# Patient Record
Sex: MALE | Race: WHITE
[De-identification: names, ages, dates, MRNs, and addresses within clinical notes are randomized per-mention and may not be internally consistent; named-entity substitution may affect disease eponyms.]

---

## 2019-12-23 ENCOUNTER — HOSPITAL ENCOUNTER (OUTPATIENT)
Dept: HOSPITAL 74 - JASU-ENDO | Age: 49
Discharge: HOME | End: 2019-12-23
Attending: INTERNAL MEDICINE
Payer: COMMERCIAL

## 2019-12-23 VITALS — BODY MASS INDEX: 0.1 KG/M2

## 2019-12-23 VITALS — TEMPERATURE: 97.6 F

## 2019-12-23 VITALS — HEART RATE: 60 BPM

## 2019-12-23 VITALS — SYSTOLIC BLOOD PRESSURE: 104 MMHG | DIASTOLIC BLOOD PRESSURE: 74 MMHG

## 2019-12-23 DIAGNOSIS — K64.8: ICD-10-CM

## 2019-12-23 DIAGNOSIS — K57.20: ICD-10-CM

## 2019-12-23 DIAGNOSIS — Z12.11: Primary | ICD-10-CM

## 2019-12-23 DIAGNOSIS — Z83.71: ICD-10-CM

## 2019-12-23 PROCEDURE — 0DJD8ZZ INSPECTION OF LOWER INTESTINAL TRACT, VIA NATURAL OR ARTIFICIAL OPENING ENDOSCOPIC: ICD-10-PCS | Performed by: INTERNAL MEDICINE

## 2023-07-03 PROBLEM — Z00.00 ENCOUNTER FOR PREVENTIVE HEALTH EXAMINATION: Status: ACTIVE | Noted: 2023-07-03

## 2023-07-05 ENCOUNTER — APPOINTMENT (OUTPATIENT)
Dept: PEDIATRIC ORTHOPEDIC SURGERY | Facility: CLINIC | Age: 53
End: 2023-07-05
Payer: COMMERCIAL

## 2023-07-05 VITALS
DIASTOLIC BLOOD PRESSURE: 70 MMHG | BODY MASS INDEX: 27.11 KG/M2 | TEMPERATURE: 96.8 F | HEIGHT: 69 IN | WEIGHT: 183 LBS | SYSTOLIC BLOOD PRESSURE: 135 MMHG

## 2023-07-05 DIAGNOSIS — Z78.9 OTHER SPECIFIED HEALTH STATUS: ICD-10-CM

## 2023-07-05 DIAGNOSIS — Z85.9 PERSONAL HISTORY OF MALIGNANT NEOPLASM, UNSPECIFIED: ICD-10-CM

## 2023-07-05 DIAGNOSIS — F17.200 NICOTINE DEPENDENCE, UNSPECIFIED, UNCOMPLICATED: ICD-10-CM

## 2023-07-05 DIAGNOSIS — Z80.9 FAMILY HISTORY OF MALIGNANT NEOPLASM, UNSPECIFIED: ICD-10-CM

## 2023-07-05 DIAGNOSIS — M65.872 OTHER SYNOVITIS AND TENOSYNOVITIS, LEFT ANKLE AND FOOT: ICD-10-CM

## 2023-07-05 PROCEDURE — 73610 X-RAY EXAM OF ANKLE: CPT | Mod: LT

## 2023-07-05 PROCEDURE — 99202 OFFICE O/P NEW SF 15 MIN: CPT

## 2023-07-05 RX ORDER — NAPROXEN 375 MG/1
375 TABLET, DELAYED RELEASE ORAL TWICE DAILY
Qty: 60 | Refills: 1 | Status: ACTIVE | COMMUNITY
Start: 2023-07-05 | End: 1900-01-01

## 2023-07-05 NOTE — HISTORY OF PRESENT ILLNESS
[de-identified] : This 53-year-old pleasant gentleman is seen today for evaluation of his left ankle and foot.  He was well until this past holiday weekend when he was in the [facility] well as [place] [facility] and he noted onset of pain swelling and mild limp.  No obvious history of traumatic or precipitating event.  No misstep.  He denies any bug bites or puncture wounds.  He has not had any other joint complaints recent illness or fever.  Past history includes BPH.  Current medications include tamsulosin.

## 2023-07-05 NOTE — PHYSICAL EXAM
[de-identified] : Examination today reveals he has minimal antalgic component to his gait on the left side he has mild swelling to the left ankle and midfoot.  He does however have good motion to the ankle and subtalar joint and all toes.  He does have tenderness along the anterior aspect of the ankle and over the anterior tibial tendon sheath region.  No discrete focal mass is noted the ankle has no tenderness to either malleolus no instability on stress.  The sole of the foot is unremarkable.  Midfoot has minimal discomfort to palpation and is an otherwise benign exam.  All compartments are soft.  There is no evidence of erythema increased warmth or fluctuance.  Neurovascular status is intact.\par \par X-rays ordered and taken today of the left ankle reveal no obvious abnormalities

## 2023-07-05 NOTE — ASSESSMENT
[FreeTextEntry1] : Impression: Synovitis/tenosynovitis left ankle.\par \par This patient will be treated with Naprosyn with GI precautions along with restricted activities.  He will call if he is not progressing in the space of 7-10 days time